# Patient Record
Sex: MALE | Race: WHITE | NOT HISPANIC OR LATINO | Employment: STUDENT | URBAN - METROPOLITAN AREA
[De-identification: names, ages, dates, MRNs, and addresses within clinical notes are randomized per-mention and may not be internally consistent; named-entity substitution may affect disease eponyms.]

---
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
## 2025-02-10 ENCOUNTER — HOSPITAL ENCOUNTER (EMERGENCY)
Facility: HOSPITAL | Age: 20
Discharge: HOME | End: 2025-02-10
Admitting: EMERGENCY MEDICINE
Payer: MEDICAID

## 2025-02-10 VITALS
RESPIRATION RATE: 20 BRPM | WEIGHT: 158.07 LBS | BODY MASS INDEX: 20.95 KG/M2 | HEIGHT: 73 IN | TEMPERATURE: 99 F | DIASTOLIC BLOOD PRESSURE: 70 MMHG | SYSTOLIC BLOOD PRESSURE: 120 MMHG | OXYGEN SATURATION: 99 % | HEART RATE: 88 BPM

## 2025-02-10 DIAGNOSIS — J03.90 TONSILLITIS: Primary | ICD-10-CM

## 2025-02-10 LAB
FLUAV SUBTYP SPEC NAA+PROBE: NOT DETECTED
FLUBV RNA ISLT QL NAA+PROBE: NOT DETECTED
S PYO AG THROAT QL: NEGATIVE
SARS-COV-2 RNA RESP QL NAA+PROBE: NOT DETECTED

## 2025-02-10 PROCEDURE — 87651 STREP A DNA AMP PROBE: CPT | Performed by: NURSE PRACTITIONER

## 2025-02-10 PROCEDURE — 87636 SARSCOV2 & INF A&B AMP PRB: CPT | Performed by: NURSE PRACTITIONER

## 2025-02-10 PROCEDURE — 99283 EMERGENCY DEPT VISIT LOW MDM: CPT

## 2025-02-10 RX ORDER — AMOXICILLIN 875 MG/1
875 TABLET, COATED ORAL 2 TIMES DAILY
Qty: 20 TABLET | Refills: 0 | Status: SHIPPED | OUTPATIENT
Start: 2025-02-10 | End: 2025-02-20

## 2025-02-10 RX ORDER — AMOXICILLIN 875 MG/1
875 TABLET, COATED ORAL 2 TIMES DAILY
Qty: 20 TABLET | Refills: 0 | Status: SHIPPED | OUTPATIENT
Start: 2025-02-10 | End: 2025-02-10

## 2025-02-10 NOTE — ED PROVIDER NOTES
Provider in Triage Note  Patient is a 20-year male presents the ED with complaint of sore throat, cough, congestion, headache.  Symptoms began in February 5.    Denies fevers.  No difficulty swallowing.  No difficulty swallowing secretions.  Due to significant overcrowding in the emergency department patient was initially seen and evaluated in triage.  Provider in triage recommended patient placement in the treatment area to initiate therapy and movement to an ER bed as soon as possible.   Orders placed; medications will be deferred to main provider per protocol.        Subjective   I saw this patient as the triage provider.  History of Present Illness  I saw this patient as the triageprovider.     History provided by:  Patient      Review of Systems    No past medical history on file.    Allergies   Allergen Reactions    Bee Venom Swelling       No past surgical history on file.    No family history on file.    Social History     Socioeconomic History    Marital status: Single           Objective   Physical Exam  Vitals and nursing note reviewed.   Constitutional:       Appearance: He is well-developed. He is not toxic-appearing.   HENT:      Head: Normocephalic and atraumatic.      Jaw: There is normal jaw occlusion.      Right Ear: Tympanic membrane normal.      Left Ear: Tympanic membrane normal.      Mouth/Throat:      Lips: Pink.      Mouth: Mucous membranes are moist.      Pharynx: Uvula midline. Oropharyngeal exudate, posterior oropharyngeal erythema and postnasal drip present. No uvula swelling.      Tonsils: Tonsillar exudate present. No tonsillar abscesses. 2+ on the right. 2+ on the left.      Comments: Tolerating secretions normally.   Eyes:      Conjunctiva/sclera: Conjunctivae normal.      Pupils: Pupils are equal, round, and reactive to light.   Neck:      Trachea: Trachea and phonation normal.   Cardiovascular:      Rate and Rhythm: Normal rate and regular rhythm.      Heart sounds: No murmur  heard.     No friction rub. No gallop.   Musculoskeletal:      Cervical back: Normal range of motion and neck supple.   Lymphadenopathy:      Cervical: No cervical adenopathy.   Skin:     General: Skin is warm.      Capillary Refill: Capillary refill takes less than 2 seconds.      Findings: No erythema or rash.   Neurological:      General: No focal deficit present.      Mental Status: He is alert and oriented to person, place, and time.   Psychiatric:         Behavior: Behavior is cooperative.         Procedures           ED Course                                                       Medical Decision Making  Problems Addressed:  Tonsillitis: complicated acute illness or injury    Risk  Prescription drug management.    Appropriate PPE worn during patient interactions.    Pt was Placed on appropriate monitoring.  Differential diagnoses considered for patient presentation, this list is not all inclusive of diagnoses considered: Tonsillitis, peritonsillar abscess, strep pharyngitis  Patient presents to the ED for the above complaint, underwent the above, exam and workup.  COVID flu are negative. Tolerating fluids. No findings concerning for peritonsillar abscess at this time.      Patient does have exudative tonsils.  Will place on amoxicillin for exudative tonsillitis.  Advised if no improvement will need to follow-up with ENT.    Considertion was given for admission, however patient has reassuring exam and workup in the ed and appears appropriate for discharge home and continued outpatient care.     We discussed my findings, plan of care, discharge instructions, the importance of follow up with their PCP/ and or specialist for repeat evaluation and to discuss any abnormal findings in labs or imaging that warrant further outpatient evaluation. We discussed that although a definitive diagnosis is not always found in the ED, it is believed emergent conditions have been ruled out, and patient is safe for discharge at  this time.  We discussed return precautions for the emergency department.  Patient verbalizes understandings, and agrees with current plan of care.      Note Disclaimer: At [facility], we believe that sharing information builds trust and better relationships. You are receiving this note because you recently visited [facility]. It is possible you will see health information before a provider has talked with you about it. This kind of information can be easy to misunderstand. To help you fully understand what it means for your health, we urge you to discuss this note with your provider  Note dictated utilizing Dragon Dictation.          Final diagnoses:   Tonsillitis       ED Disposition  ED Disposition       ED Disposition   Discharge    Condition   Stable    Comment   --               Cristela Zepeda MD  [address] [facility] [place] [address] [phone]          [facility] EMERGENCY DEPARTMENT  [address] [address] [phone]        [facility]  [address] [facility] [address]  [phone]             Medication List        New Prescriptions      amoxicillin 875 MG tablet  Commonly known as: AMOXIL  Take 1 tablet by mouth 2 (Two) Times a Day for 10 days.               Where to Get Your Medications        These medications were sent to [facility] - [place], [place] - [address] - [phone] [facility] [phone]   [address] [facility] [place] [address]      Phone: [phone]   amoxicillin 875 MG tablet            Lorraine Blue APRN  02/10/25 1948

## 2025-02-11 NOTE — DISCHARGE INSTRUCTIONS
Follow up with PCP, call for an appointment in 1-2 days, if you do not have a primary care provider please use patient connection [phone] to help establish care  Follow up with any specialist as indicated and discussed  Return to the ED for new or worsening symptoms  Take any medications as prescribed

## 2025-02-13 ENCOUNTER — HOSPITAL ENCOUNTER (EMERGENCY)
Facility: HOSPITAL | Age: 20
Discharge: HOME | End: 2025-02-14
Payer: MEDICAID

## 2025-02-13 VITALS
DIASTOLIC BLOOD PRESSURE: 83 MMHG | HEIGHT: 74 IN | OXYGEN SATURATION: 98 % | TEMPERATURE: 98.8 F | RESPIRATION RATE: 16 BRPM | HEART RATE: 85 BPM | BODY MASS INDEX: 19.51 KG/M2 | WEIGHT: 152 LBS | SYSTOLIC BLOOD PRESSURE: 141 MMHG

## 2025-02-13 DIAGNOSIS — J03.90 TONSILLITIS: Primary | ICD-10-CM

## 2025-02-13 LAB — S PYO AG THROAT QL: NEGATIVE

## 2025-02-13 PROCEDURE — 87651 STREP A DNA AMP PROBE: CPT | Performed by: NURSE PRACTITIONER

## 2025-02-13 PROCEDURE — 99283 EMERGENCY DEPT VISIT LOW MDM: CPT

## 2025-02-13 RX ORDER — HYDROCODONE BITARTRATE AND ACETAMINOPHEN 7.5; 325 MG/15ML; MG/15ML
15 SOLUTION ORAL ONCE
Status: COMPLETED | OUTPATIENT
Start: 2025-02-13 | End: 2025-02-13

## 2025-02-13 RX ADMIN — BENZOCAINE 6 MG-MENTHOL 10 MG LOZENGES 1 EACH: at 23:59

## 2025-02-13 RX ADMIN — HYDROCODONE BITARTRATE AND ACETAMINOPHEN 15 ML: 7.5; 325 SOLUTION ORAL at 23:58

## 2025-02-13 NOTE — Clinical Note
[facility] EMERGENCY DEPARTMENT  [address] [place] [address]  Phone: [phone]    Francis Ambrosio was seen and treated in our emergency department on 2/13/2025.  He may return to work on 02/17/2025.         Thank you for choosing [facility].    Bhavana Obrien RN

## 2025-02-14 RX ORDER — AZITHROMYCIN 250 MG/1
TABLET, FILM COATED ORAL
Qty: 6 TABLET | Refills: 0 | Status: SHIPPED | OUTPATIENT
Start: 2025-02-14

## 2025-02-14 RX ORDER — AZITHROMYCIN 250 MG/1
500 TABLET, FILM COATED ORAL ONCE
Status: COMPLETED | OUTPATIENT
Start: 2025-02-14 | End: 2025-02-14

## 2025-02-14 RX ORDER — HYDROCODONE BITARTRATE AND ACETAMINOPHEN 7.5; 325 MG/15ML; MG/15ML
15 SOLUTION ORAL EVERY 6 HOURS PRN
Qty: 90 ML | Refills: 0 | Status: SHIPPED | OUTPATIENT
Start: 2025-02-14 | End: 2025-02-14 | Stop reason: SDUPTHER

## 2025-02-14 RX ORDER — HYDROCODONE BITARTRATE AND ACETAMINOPHEN 5; 325 MG/1; MG/1
1 TABLET ORAL EVERY 6 HOURS PRN
Qty: 12 TABLET | Refills: 0 | Status: SHIPPED | OUTPATIENT
Start: 2025-02-14 | End: 2025-02-17

## 2025-02-14 RX ADMIN — AZITHROMYCIN 500 MG: 250 TABLET, FILM COATED ORAL at 00:06

## 2025-02-14 NOTE — DISCHARGE INSTRUCTIONS
Push clear liquid    Use liquid Motrin as needed for discomfort use Hycet as needed for severe pain do not mix with any additional Tylenol do not drive or mix with alcohol    Stop amoxicillin and use Zithromax as per

## 2025-02-14 NOTE — ED PROVIDER NOTES
Subjective   History of Present Illness  Patient is a 20-year-old male who was seen here 2 days ago and was found to be strep negative but has extensive exudate and pain on swallowing and was started on amoxicillin today returns because he cannot eat or drink due to pain but states he is not really taking anything for pain either.      Review of Systems   HENT:  Positive for sore throat. Negative for mouth sores.        No past medical history on file.    Allergies   Allergen Reactions    Bee Venom Swelling       No past surgical history on file.    No family history on file.    Social History     Socioeconomic History    Marital status: Single           Objective   Physical Exam  Vitals reviewed.   Constitutional:       Appearance: He is well-developed.   HENT:      Head: Normocephalic and atraumatic.      Mouth/Throat:      Lips: Pink.      Mouth: Mucous membranes are moist. No angioedema.      Pharynx: Uvula midline. Oropharyngeal exudate and posterior oropharyngeal erythema present. No uvula swelling.      Tonsils: Tonsillar exudate present. No tonsillar abscesses. 2+ on the right. 2+ on the left.   Eyes:      Conjunctiva/sclera: Conjunctivae normal.      Pupils: Pupils are equal, round, and reactive to light.   Cardiovascular:      Rate and Rhythm: Normal rate and regular rhythm.      Heart sounds: Normal heart sounds.   Pulmonary:      Effort: Pulmonary effort is normal.      Breath sounds: Normal breath sounds.   Abdominal:      General: Bowel sounds are normal.      Palpations: Abdomen is soft.   Musculoskeletal:         General: Normal range of motion.      Cervical back: Normal range of motion and neck supple.   Skin:     General: Skin is warm and dry.   Neurological:      Mental Status: He is alert and oriented to person, place, and time.      GCS: GCS eye subscore is 4. GCS verbal subscore is 5. GCS motor subscore is 6.         Procedures           ED Course                                           BP  "141/83   Pulse 85   Temp 98.8 °F (37.1 °C)   Resp 16   Ht 188 cm (74\")   Wt 68.9 kg (152 lb)   SpO2 98%   BMI 19.52 kg/m²   Labs Reviewed   RAPID STREP A SCREEN - Normal     Medications   HYDROcodone-acetaminophen (HYCET) 7.5-325 MG/15ML solution 15 mL (15 mL Oral Given 2/13/25 4699)   benzocaine-menthol (CHLORASEPTIC) lozenge 1 each (1 each Mouth/Throat Given 2/13/25 5195)   azithromycin (ZITHROMAX) tablet 500 mg (500 mg Oral Given 2/14/25 0006)     No radiology results for the last day              Medical Decision Making  Patient had above exam and repeat strep test was negative.  This patient was discussed the possibility of gonorrhea and chlamydia he states that he does not feel this is possible.  We also talked about the possibility that this is just viral and that antibiotics are not going to help it and it would just take some time he was treated with some Hycet here in the emergency room as well as Cepacol throat lozenges and felt better on discharge he was sent home with some Hycet we did change the antibiotic to Zithromax for better coverage and was advised to return for any difficulty breathing or swallowing he verbalized understood discharge instructions were taken home by his female cousin at bedside    Problems Addressed:  Tonsillitis: complicated acute illness or injury    Amount and/or Complexity of Data Reviewed  Labs: ordered. Decision-making details documented in ED Course.  ECG/medicine tests: ordered and independent interpretation performed. Decision-making details documented in ED Course.    Risk  OTC drugs.  Prescription drug management.        Final diagnoses:   Tonsillitis       ED Disposition  ED Disposition       ED Disposition   Discharge    Condition   Stable    Comment   --               Cristela Zepeda MD  [address] [facility] [place] [address]  [phone]    In 3 days  If symptoms worsen, As needed         Medication List        New Prescriptions      azithromycin 250 " MG tablet  Commonly known as: Zithromax Z-Julian  Take 2 tablets the first day, then 1 tablet daily for 4 days.     HYDROcodone-acetaminophen 7.5-325 MG/15ML solution  Commonly known as: HYCET  Take 15 mL by mouth Every 6 (Six) Hours As Needed for Moderate Pain.               Where to Get Your Medications        These medications were sent to [facility] - [place], [place] - [address] [facility], [facility] - [phone]  - [phone] FX  [address], [facility] [place] [address]      Phone: [phone]   azithromycin 250 MG tablet  HYDROcodone-acetaminophen 7.5-325 MG/15ML solution            Carolynn Sanchez, APRN  02/14/25 0012